# Patient Record
Sex: FEMALE | Race: WHITE | NOT HISPANIC OR LATINO
[De-identification: names, ages, dates, MRNs, and addresses within clinical notes are randomized per-mention and may not be internally consistent; named-entity substitution may affect disease eponyms.]

---

## 2020-02-01 ENCOUNTER — ASOB RESULT (OUTPATIENT)
Age: 40
End: 2020-02-01

## 2020-02-01 ENCOUNTER — APPOINTMENT (OUTPATIENT)
Dept: ANTEPARTUM | Facility: CLINIC | Age: 40
End: 2020-02-01
Payer: COMMERCIAL

## 2020-02-01 PROCEDURE — 76813 OB US NUCHAL MEAS 1 GEST: CPT

## 2020-02-01 PROCEDURE — 76801 OB US < 14 WKS SINGLE FETUS: CPT

## 2020-02-01 PROCEDURE — 36416 COLLJ CAPILLARY BLOOD SPEC: CPT

## 2020-02-05 PROBLEM — Z00.00 ENCOUNTER FOR PREVENTIVE HEALTH EXAMINATION: Status: ACTIVE | Noted: 2020-02-05

## 2020-03-20 ENCOUNTER — APPOINTMENT (OUTPATIENT)
Dept: ANTEPARTUM | Facility: CLINIC | Age: 40
End: 2020-03-20
Payer: COMMERCIAL

## 2020-03-20 ENCOUNTER — ASOB RESULT (OUTPATIENT)
Age: 40
End: 2020-03-20

## 2020-03-20 PROCEDURE — 76811 OB US DETAILED SNGL FETUS: CPT

## 2020-03-24 ENCOUNTER — APPOINTMENT (OUTPATIENT)
Dept: ANTEPARTUM | Facility: CLINIC | Age: 40
End: 2020-03-24
Payer: COMMERCIAL

## 2020-03-24 ENCOUNTER — ASOB RESULT (OUTPATIENT)
Age: 40
End: 2020-03-24

## 2020-03-24 PROCEDURE — 76816 OB US FOLLOW-UP PER FETUS: CPT

## 2020-08-04 ENCOUNTER — OUTPATIENT (OUTPATIENT)
Dept: INPATIENT UNIT | Facility: HOSPITAL | Age: 40
LOS: 1 days | Discharge: HOME | End: 2020-08-04

## 2020-08-04 ENCOUNTER — OUTPATIENT (OUTPATIENT)
Dept: OUTPATIENT SERVICES | Facility: HOSPITAL | Age: 40
LOS: 1 days | Discharge: HOME | End: 2020-08-04
Payer: COMMERCIAL

## 2020-08-04 VITALS — DIASTOLIC BLOOD PRESSURE: 74 MMHG | SYSTOLIC BLOOD PRESSURE: 119 MMHG | HEART RATE: 75 BPM

## 2020-08-04 VITALS
DIASTOLIC BLOOD PRESSURE: 78 MMHG | HEART RATE: 67 BPM | TEMPERATURE: 98 F | RESPIRATION RATE: 16 BRPM | SYSTOLIC BLOOD PRESSURE: 129 MMHG

## 2020-08-04 VITALS — SYSTOLIC BLOOD PRESSURE: 113 MMHG | DIASTOLIC BLOOD PRESSURE: 74 MMHG | HEART RATE: 71 BPM

## 2020-08-04 VITALS — TEMPERATURE: 98 F

## 2020-08-04 DIAGNOSIS — Z3A.00 WEEKS OF GESTATION OF PREGNANCY NOT SPECIFIED: ICD-10-CM

## 2020-08-04 DIAGNOSIS — O26.899 OTHER SPECIFIED PREGNANCY RELATED CONDITIONS, UNSPECIFIED TRIMESTER: ICD-10-CM

## 2020-08-04 PROCEDURE — 59025 FETAL NON-STRESS TEST: CPT | Mod: 26

## 2020-08-04 PROCEDURE — 99215 OFFICE O/P EST HI 40 MIN: CPT | Mod: 25

## 2020-08-04 NOTE — OB PROVIDER TRIAGE NOTE - NSHPPHYSICALEXAM_GEN_ALL_CORE
Gen: A&O x 3; Appears uncomfortable with ctx's  Vitals: BP-124/83; P-68; T-36.7'    Pulm-CTA B/L; no wheezes  Cor-Clear S1S2; no murmur  ABd exam-soft and nontender    VE-1.5/80/-2    NST cat I with 135 baseline +accels and mod variability; irreg ctx's

## 2020-08-04 NOTE — OB RN TRIAGE NOTE - NS PRO PASSIVE SMOKE EXP
Per verbal with Dr. Lescher, try either of the neuropysch listed below.  Mom is informed and verbalizes understanding.    No

## 2020-08-04 NOTE — OB PROVIDER TRIAGE NOTE - NSOBPROVIDERNOTE_OBGYN_ALL_OB_FT
40yo Caucsian female  @ 38.6 wks SLIUP uncomp PNC here complaining of frequent painful ctx's  -VE at 2:30a was 1 cm; now 1.5  -NST cat I with irreg ctx's  -pt was offered dc home or IV pain meds/therapeutic rest 40yo Caucsian female  @ 38.6 wks SLIUP uncomp PNC here complaining of frequent painful ctx's  -VE at 2:30a was 1 cm; now 1.5  -NST cat I with irreg ctx's  -pt was offered dc home or IV pain meds/therapeutic rest  -----------------------  -pt and partner discussed therapeutic rest vs dc and opt to be discharged home  -pt was dc home with instructions. Pt was dc Dr Ojeda

## 2020-08-04 NOTE — OB PROVIDER TRIAGE NOTE - HISTORY OF PRESENT ILLNESS
39 yr old  @ 38.6 wks. presents with ctx since 11 pm. Denies VB.LOF. Reports positive fetal movement. GBS negative  PNI: denies  Gynhx: denies  Surghx: denies  Medhx: denies

## 2020-08-04 NOTE — OB PROVIDER TRIAGE NOTE - HISTORY OF PRESENT ILLNESS
40yo  female  @ 38.6 wks SLIUP uncomp PNC here complainig of frequent painful ctx's. Pt was here this morning at 2:30am for ctx's and was dc at 1 cm. Pt is intact with GFM.    Pmhx-denies  Pshx/Hosp-denies  Meds-PNV  NKDA  Past ob-denies  Gyn-denies

## 2020-08-05 ENCOUNTER — INPATIENT (INPATIENT)
Facility: HOSPITAL | Age: 40
LOS: 0 days | Discharge: HOME | End: 2020-08-06
Attending: OBSTETRICS & GYNECOLOGY | Admitting: OBSTETRICS & GYNECOLOGY

## 2020-08-05 ENCOUNTER — TRANSCRIPTION ENCOUNTER (OUTPATIENT)
Age: 40
End: 2020-08-05

## 2020-08-05 VITALS
DIASTOLIC BLOOD PRESSURE: 70 MMHG | TEMPERATURE: 98 F | HEART RATE: 84 BPM | SYSTOLIC BLOOD PRESSURE: 116 MMHG | RESPIRATION RATE: 18 BRPM

## 2020-08-05 DIAGNOSIS — Z3A.00 WEEKS OF GESTATION OF PREGNANCY NOT SPECIFIED: ICD-10-CM

## 2020-08-05 DIAGNOSIS — O26.899 OTHER SPECIFIED PREGNANCY RELATED CONDITIONS, UNSPECIFIED TRIMESTER: ICD-10-CM

## 2020-08-05 PROBLEM — Z78.9 OTHER SPECIFIED HEALTH STATUS: Chronic | Status: ACTIVE | Noted: 2020-08-04

## 2020-08-05 LAB
BASOPHILS # BLD AUTO: 0.02 K/UL — SIGNIFICANT CHANGE UP (ref 0–0.2)
BASOPHILS NFR BLD AUTO: 0.1 % — SIGNIFICANT CHANGE UP (ref 0–2)
BLD GP AB SCN SERPL QL: NEGATIVE — SIGNIFICANT CHANGE UP
EOSINOPHIL # BLD AUTO: 0.03 K/UL — SIGNIFICANT CHANGE UP (ref 0–0.5)
EOSINOPHIL NFR BLD AUTO: 0.2 % — SIGNIFICANT CHANGE UP (ref 0–6)
HCT VFR BLD CALC: 40.9 % — SIGNIFICANT CHANGE UP (ref 34.5–45)
HGB BLD-MCNC: 13.8 G/DL — SIGNIFICANT CHANGE UP (ref 11.5–15.5)
IMM GRANULOCYTES NFR BLD AUTO: 0.5 % — SIGNIFICANT CHANGE UP (ref 0–1.5)
LYMPHOCYTES # BLD AUTO: 0.71 K/UL — LOW (ref 1–3.3)
LYMPHOCYTES # BLD AUTO: 4.8 % — LOW (ref 13–44)
MCHC RBC-ENTMCNC: 32.8 PG — SIGNIFICANT CHANGE UP (ref 27–34)
MCHC RBC-ENTMCNC: 33.7 % — SIGNIFICANT CHANGE UP (ref 32–36)
MCV RBC AUTO: 97.1 FL — SIGNIFICANT CHANGE UP (ref 80–100)
MONOCYTES # BLD AUTO: 0.41 K/UL — SIGNIFICANT CHANGE UP (ref 0–0.9)
MONOCYTES NFR BLD AUTO: 2.8 % — SIGNIFICANT CHANGE UP (ref 2–14)
NEUTROPHILS # BLD AUTO: 13.53 K/UL — HIGH (ref 1.8–7.4)
NEUTROPHILS NFR BLD AUTO: 91.6 % — HIGH (ref 43–77)
NRBC # FLD: 0 K/UL — SIGNIFICANT CHANGE UP (ref 0–0)
PLATELET # BLD AUTO: 253 K/UL — SIGNIFICANT CHANGE UP (ref 150–400)
PMV BLD: 12.1 FL — SIGNIFICANT CHANGE UP (ref 7–13)
RBC # BLD: 4.21 M/UL — SIGNIFICANT CHANGE UP (ref 3.8–5.2)
RBC # FLD: 13.2 % — SIGNIFICANT CHANGE UP (ref 10.3–14.5)
RH IG SCN BLD-IMP: POSITIVE — SIGNIFICANT CHANGE UP
RH IG SCN BLD-IMP: POSITIVE — SIGNIFICANT CHANGE UP
SARS-COV-2 RNA SPEC QL NAA+PROBE: SIGNIFICANT CHANGE UP
T PALLIDUM AB TITR SER: NEGATIVE — SIGNIFICANT CHANGE UP
WBC # BLD: 14.78 K/UL — HIGH (ref 3.8–10.5)
WBC # FLD AUTO: 14.78 K/UL — HIGH (ref 3.8–10.5)

## 2020-08-05 RX ORDER — DIBUCAINE 1 %
1 OINTMENT (GRAM) RECTAL EVERY 6 HOURS
Refills: 0 | Status: DISCONTINUED | OUTPATIENT
Start: 2020-08-05 | End: 2020-08-06

## 2020-08-05 RX ORDER — ACETAMINOPHEN 500 MG
3 TABLET ORAL
Qty: 0 | Refills: 0 | DISCHARGE
Start: 2020-08-05

## 2020-08-05 RX ORDER — SODIUM CHLORIDE 9 MG/ML
3 INJECTION INTRAMUSCULAR; INTRAVENOUS; SUBCUTANEOUS EVERY 8 HOURS
Refills: 0 | Status: DISCONTINUED | OUTPATIENT
Start: 2020-08-05 | End: 2020-08-06

## 2020-08-05 RX ORDER — MAGNESIUM HYDROXIDE 400 MG/1
30 TABLET, CHEWABLE ORAL
Refills: 0 | Status: DISCONTINUED | OUTPATIENT
Start: 2020-08-05 | End: 2020-08-06

## 2020-08-05 RX ORDER — SODIUM CHLORIDE 9 MG/ML
1000 INJECTION, SOLUTION INTRAVENOUS
Refills: 0 | Status: DISCONTINUED | OUTPATIENT
Start: 2020-08-05 | End: 2020-08-05

## 2020-08-05 RX ORDER — SIMETHICONE 80 MG/1
1 TABLET, CHEWABLE ORAL
Qty: 0 | Refills: 0 | DISCHARGE
Start: 2020-08-05

## 2020-08-05 RX ORDER — BENZOCAINE 10 %
1 GEL (GRAM) MUCOUS MEMBRANE EVERY 6 HOURS
Refills: 0 | Status: DISCONTINUED | OUTPATIENT
Start: 2020-08-05 | End: 2020-08-06

## 2020-08-05 RX ORDER — OXYTOCIN 10 UNIT/ML
1 VIAL (ML) INJECTION
Qty: 30 | Refills: 0 | Status: DISCONTINUED | OUTPATIENT
Start: 2020-08-05 | End: 2020-08-05

## 2020-08-05 RX ORDER — AER TRAVELER 0.5 G/1
1 SOLUTION RECTAL; TOPICAL
Qty: 0 | Refills: 0 | DISCHARGE
Start: 2020-08-05

## 2020-08-05 RX ORDER — OXYTOCIN 10 UNIT/ML
333.33 VIAL (ML) INJECTION
Qty: 20 | Refills: 0 | Status: COMPLETED | OUTPATIENT
Start: 2020-08-05 | End: 2020-08-05

## 2020-08-05 RX ORDER — HYDROCORTISONE 1 %
1 OINTMENT (GRAM) TOPICAL EVERY 6 HOURS
Refills: 0 | Status: DISCONTINUED | OUTPATIENT
Start: 2020-08-05 | End: 2020-08-06

## 2020-08-05 RX ORDER — IBUPROFEN 200 MG
1 TABLET ORAL
Qty: 0 | Refills: 0 | DISCHARGE
Start: 2020-08-05

## 2020-08-05 RX ORDER — KETOROLAC TROMETHAMINE 30 MG/ML
30 SYRINGE (ML) INJECTION ONCE
Refills: 0 | Status: DISCONTINUED | OUTPATIENT
Start: 2020-08-05 | End: 2020-08-05

## 2020-08-05 RX ORDER — DIPHENHYDRAMINE HCL 50 MG
25 CAPSULE ORAL EVERY 6 HOURS
Refills: 0 | Status: DISCONTINUED | OUTPATIENT
Start: 2020-08-05 | End: 2020-08-06

## 2020-08-05 RX ORDER — TETANUS TOXOID, REDUCED DIPHTHERIA TOXOID AND ACELLULAR PERTUSSIS VACCINE, ADSORBED 5; 2.5; 8; 8; 2.5 [IU]/.5ML; [IU]/.5ML; UG/.5ML; UG/.5ML; UG/.5ML
0.5 SUSPENSION INTRAMUSCULAR ONCE
Refills: 0 | Status: DISCONTINUED | OUTPATIENT
Start: 2020-08-05 | End: 2020-08-06

## 2020-08-05 RX ORDER — OXYTOCIN 10 UNIT/ML
333.33 VIAL (ML) INJECTION
Qty: 20 | Refills: 0 | Status: DISCONTINUED | OUTPATIENT
Start: 2020-08-05 | End: 2020-08-06

## 2020-08-05 RX ORDER — ACETAMINOPHEN 500 MG
975 TABLET ORAL
Refills: 0 | Status: DISCONTINUED | OUTPATIENT
Start: 2020-08-05 | End: 2020-08-06

## 2020-08-05 RX ORDER — SIMETHICONE 80 MG/1
80 TABLET, CHEWABLE ORAL EVERY 4 HOURS
Refills: 0 | Status: DISCONTINUED | OUTPATIENT
Start: 2020-08-05 | End: 2020-08-06

## 2020-08-05 RX ORDER — IBUPROFEN 200 MG
600 TABLET ORAL EVERY 6 HOURS
Refills: 0 | Status: COMPLETED | OUTPATIENT
Start: 2020-08-05 | End: 2021-07-04

## 2020-08-05 RX ORDER — PRAMOXINE HYDROCHLORIDE 150 MG/15G
1 AEROSOL, FOAM RECTAL EVERY 4 HOURS
Refills: 0 | Status: DISCONTINUED | OUTPATIENT
Start: 2020-08-05 | End: 2020-08-06

## 2020-08-05 RX ORDER — LANOLIN
1 OINTMENT (GRAM) TOPICAL EVERY 6 HOURS
Refills: 0 | Status: DISCONTINUED | OUTPATIENT
Start: 2020-08-05 | End: 2020-08-06

## 2020-08-05 RX ORDER — IBUPROFEN 200 MG
600 TABLET ORAL EVERY 6 HOURS
Refills: 0 | Status: DISCONTINUED | OUTPATIENT
Start: 2020-08-05 | End: 2020-08-06

## 2020-08-05 RX ORDER — AER TRAVELER 0.5 G/1
1 SOLUTION RECTAL; TOPICAL EVERY 4 HOURS
Refills: 0 | Status: DISCONTINUED | OUTPATIENT
Start: 2020-08-05 | End: 2020-08-06

## 2020-08-05 RX ADMIN — PRAMOXINE HYDROCHLORIDE 1 APPLICATION(S): 150 AEROSOL, FOAM RECTAL at 18:35

## 2020-08-05 RX ADMIN — Medication 1000 MILLIUNIT(S)/MIN: at 14:45

## 2020-08-05 RX ADMIN — Medication 1 SPRAY(S): at 18:35

## 2020-08-05 RX ADMIN — Medication 975 MILLIGRAM(S): at 18:35

## 2020-08-05 RX ADMIN — Medication 1 APPLICATION(S): at 18:35

## 2020-08-05 RX ADMIN — Medication 30 MILLIGRAM(S): at 13:07

## 2020-08-05 RX ADMIN — Medication 30 MILLIGRAM(S): at 11:52

## 2020-08-05 RX ADMIN — AER TRAVELER 1 APPLICATION(S): 0.5 SOLUTION RECTAL; TOPICAL at 18:35

## 2020-08-05 RX ADMIN — Medication 975 MILLIGRAM(S): at 19:06

## 2020-08-05 RX ADMIN — Medication 1 MILLIUNIT(S)/MIN: at 06:36

## 2020-08-05 NOTE — CHART NOTE - NSCHARTNOTEFT_GEN_A_CORE
Patient seen at bedside   Reports pressure   Tracing category 1   [place]; irregular   VE: 10/100/0   Trial pushing initiated however contractions too far apart   Pitocin to be started   Reassess as needed

## 2020-08-05 NOTE — OB PROVIDER H&P - HISTORY OF PRESENT ILLNESS
39y  at 39w0d presents to triage c/o LOF since 2am today and also having strong uterine contractions.  Reports +FM, no vaginal bleeding  Prenatal care: Dr Meza  GBS: Negative 20;

## 2020-08-05 NOTE — DISCHARGE NOTE OB - MEDICATION SUMMARY - MEDICATIONS TO TAKE
I will START or STAY ON the medications listed below when I get home from the hospital:    ibuprofen 600 mg oral tablet  -- 1 tab(s) by mouth every 6 hours, As Needed  -- Indication: For pain    acetaminophen 325 mg oral tablet  -- 3 tab(s) by mouth , As Needed  -- Indication: For pain    witch hazel 50% topical pad  -- 1 application on skin every 4 hours, As needed, Perineal discomfort  -- Indication: For perineal discomfort    simethicone 80 mg oral tablet, chewable  -- 1 tab(s) by mouth every 4 hours, As needed, Gas  -- Indication: For gas

## 2020-08-05 NOTE — DISCHARGE NOTE OB - PATIENT PORTAL LINK FT
You can access the FollowMyHealth Patient Portal offered by [facility] by registering at the following website: [email or web address]. By joining [employer]’s FollowMyHealth portal, you will also be able to view your health information using other applications (apps) compatible with our system.

## 2020-08-05 NOTE — OB PROVIDER TRIAGE NOTE - NSHPPHYSICALEXAM_GEN_ALL_CORE
T(C): 36.6 (08-05-20 @ 02:57), Max: 36.7 (08-04-20 @ 17:18)  HR: 84 (08-05-20 @ 02:57) (67 - 84)  BP: 116/70 (08-05-20 @ 02:57) (113/74 - 129/78)  RR: 18 (08-05-20 @ 02:55) (16 - 18)    Heart: RRR  Lungs: CTA  Abdomen: Gravid, soft, NT    NST: Reactive with moderate variability, Category 1 tracing  [place]: Irregular contractions  VE: 4/90/-1, Gross ROM, bloody with thick meconium

## 2020-08-05 NOTE — OB PROVIDER DELIVERY SUMMARY - NSPROVIDERDELIVERYNOTE_OBGYN_ALL_OB_FT
Vacuum assisted delivery of liveborn female infant from JIHAN position. Head, shoulders, and body delivered without complication. Infant was suctioned. heavy mec. Delayed cord clamping and infant was passed to mother. Cord clamped and cut. Placenta delivered intact with a 3 vessel cord. Fundal massage was given and uterine fundus was found to be firm. Vaginal exam revealed an intact cervix, vaginal walls and sulci. Patient had a second degree laceration with a left labial tear that was repaired with 2.0 chromic suture. Excellent hemostasis was noted. Patient was stable and went to recovery. Count was correct x 2.

## 2020-08-05 NOTE — CHART NOTE - NSCHARTNOTEFT_GEN_A_CORE
Patient seen at bedside   Resting comfortably with epidural in place   Tracing category 1   [place]: irregular   VE: 9.5/100/0   Reassess as needed

## 2020-08-05 NOTE — DISCHARGE NOTE OB - CARE PLAN
Principal Discharge DX:	 (normal spontaneous vaginal delivery)  Goal:	post partum care  Assessment and plan of treatment:	recovery

## 2020-08-05 NOTE — OB PROVIDER H&P - ASSESSMENT
39y  at 39w0d presents to triage c/o LOF since 2am today and also having strong uterine contractions.  Reports +FM, no vaginal bleeding  Prenatal care: Dr Meza  GBS: Negative 20;     GYN: Denies any h/o STDs, fibroids, ovarian Cyst, or abnormal pap smear  OBH:   PAST MEDICAL & SURGICAL HISTORY: Denies    No Known Allergies    MEDICATIONS  (PRN):    T(C): 36.6 (20 @ 02:57), Max: 36.7 (20 @ 17:18)  HR: 84 (20 @ 02:57) (67 - 84)  BP: 116/70 (20 @ 02:57) (113/74 - 129/78)  RR: 18 (20 @ 02:55) (16 - 18)    Heart: RRR  Lungs: CTA  Abdomen: Gravid, soft, NT    NST: Reactive with moderate variability, Category 1 tracing  [place]: Irregular contractions  VE: 4/90/-1, Gross ROM, bloody with thick meconium    A/P: 39y  at 39w0d in active labor with SROM             Thick meconium present  D/w Dr Ojeda  -Admit to labor and delivery  -Pain Management: Epidural  -Cont EFM/[place]  -Admission labs: CBC, RPR, T&S and covid 19 sent  -IV hydration  -Clear liquid diet

## 2020-08-05 NOTE — OB NEONATOLOGY/PEDIATRICIAN DELIVERY SUMMARY - NSPEDSNEONOTESA_OBGYN_ALL_OB_FT
Called to this vaginal vacuum- assist delivery d/t Meconium of this 39 week gestation female infant. Mom is a 40 yo  O+ with unremarkable prenatal labs. GBS negative on . SROM ~ 8.5 hrs PTD, thick meconium. Covid negative. Infant born with spontaneous cry. Cord clamping delayed for 30 seconds. W/D/S/S and bulb suctioning mouth/nares. Apgar scores were 8 & 9. EOS 0.08. Infant noted to have mild substernal retractions . Saturations 94- 98 % in room air @ 10 minutes of life. Called to this vaginal vacuum- assist delivery d/t Meconium of this 39 week gestation female infant. Mom is a 38 yo  O+ with unremarkable prenatal labs. GBS negative on . SROM ~ 8.5 hrs PTD, thick meconium. Covid negative. Infant born with spontaneous cry. Cord clamping delayed for 30 seconds. W/D/S/S and bulb suctioning mouth/nares. Apgar scores were 8 & 9. EOS 0.08. Infant noted to have mild substernal retractions which resolved . Saturations 95- 98 % in room air @ 10 minutes of life. Mom wants Hep B and to bottle feed.

## 2020-08-05 NOTE — OB PROVIDER TRIAGE NOTE - NSOBPROVIDERNOTE_OBGYN_ALL_OB_FT
39y  at 39w0d presents to triage c/o LOF since 2am today and also having strong uterine contractions.  Reports +FM, no vaginal bleeding  Prenatal care: Dr Meza  GBS: Negative 20;     GYN: Denies any h/o STDs, fibroids, ovarian Cyst, or abnormal pap smear  OBH:   PAST MEDICAL & SURGICAL HISTORY: Denies    No Known Allergies    MEDICATIONS  (PRN):    T(C): 36.6 (20 @ 02:57), Max: 36.7 (20 @ 17:18)  HR: 84 (20 @ 02:57) (67 - 84)  BP: 116/70 (20 @ 02:57) (113/74 - 129/78)  RR: 18 (20 @ 02:55) (16 - 18)    Heart: RRR  Lungs: CTA  Abdomen: Gravid, soft, NT    NST: Reactive with moderate variability, Category 1 tracing  [place]: Irregular contractions  VE: 4/90/-1, Gross ROM, bloody with thick meconium    A/P: 39y  at 39w0d in active labor with SROM             Thick meconium present  D/w Dr Ojeda  -Admit to labor and delivery  -Pain Management: Epidural  -Cont EFM/[place]  -Admission labs: CBC, RPR, T&S  -IV hydration  -Clear liquid diet

## 2020-08-05 NOTE — OB PROVIDER H&P - PROBLEM SELECTOR PLAN 1
-Admit to labor and delivery  -Pain Management: Epidural  -Cont EFM/[place]  -Admission labs: CBC, RPR, T&S and covid 19 sent  -IV hydration  -Clear liquid diet

## 2020-08-05 NOTE — OB RN DELIVERY SUMMARY - NS_SEPSISRSKCALC_OBGYN_ALL_OB_FT
EOS calculated successfully. EOS Risk Factor: 0.5/1000 live births ([facility] national incidence); GA=39w;Temp=98.06; ROM=8.417; GBS='Negative'; Antibiotics='No antibiotics or any antibiotics < 2 hrs prior to birth'

## 2020-08-05 NOTE — OB PROVIDER TRIAGE NOTE - HISTORY OF PRESENT ILLNESS
CC: pt states "my water broke at 0200am and I'm having contractions every minute" 39y  at 39w0d presents to triage c/o LOF since 2am today and also having strong uterine contractions.  Reports +FM, no vaginal bleeding  Prenatal care: Dr Meza  GBS: Negative 20;

## 2020-08-05 NOTE — OB PROVIDER H&P - NSHPPHYSICALEXAM_GEN_ALL_CORE
T(C): 36.6 (08-05-20 @ 02:57), Max: 36.7 (08-04-20 @ 17:18)  HR: 84 (08-05-20 @ 02:57) (67 - 84)  BP: 116/70 (08-05-20 @ 02:57) (113/74 - 129/78)  RR: 18 (08-05-20 @ 02:55) (16 - 18)    Heart: RRR  Lungs: CTA  Abdomen: Gravid, soft, NT    NST: Reactive with moderate variability, Category 1 tracing  [place]: Irregular contractions  VE: 4/90/-1, Gross ROM, bloody with thick meconium  TAS: Cephalic confirmed

## 2020-08-05 NOTE — DISCHARGE NOTE OB - CARE PROVIDER_API CALL
Johan Gee)  Obstetrics and Gynecology Obstetrics and Gynocology  [address] [place] [address]  Phone: [phone]  Fax: [phone]  Follow Up Time: 1 month

## 2020-08-06 VITALS
RESPIRATION RATE: 16 BRPM | TEMPERATURE: 99 F | OXYGEN SATURATION: 100 % | SYSTOLIC BLOOD PRESSURE: 122 MMHG | HEART RATE: 67 BPM | DIASTOLIC BLOOD PRESSURE: 76 MMHG

## 2020-08-06 RX ADMIN — SODIUM CHLORIDE 3 MILLILITER(S): 9 INJECTION INTRAMUSCULAR; INTRAVENOUS; SUBCUTANEOUS at 06:22

## 2020-08-06 RX ADMIN — Medication 975 MILLIGRAM(S): at 06:22

## 2020-08-06 RX ADMIN — Medication 975 MILLIGRAM(S): at 13:00

## 2020-08-06 RX ADMIN — Medication 975 MILLIGRAM(S): at 13:50

## 2020-08-06 NOTE — PROGRESS NOTE ADULT - SUBJECTIVE AND OBJECTIVE BOX
NP Provider Note:  Patient seen at bedside resting comfortably offers no current complaints.  Ambulating and voiding without difficulty.  Passing flatus and tolerating regular diet.  both breast/bottle feeding. Bonding well w/ .  Denies HA, CP, SOB, N/V/D, dizziness, palpitations, worsening abdominal pain, worsening vaginal bleeding, or any other concerns.      Vital Signs Last 24 Hrs  T(C): 36.5 (06 Aug 2020 06:03), Max: 36.8 (05 Aug 2020 16:58)  T(F): 97.7 (06 Aug 2020 06:03), Max: 98.3 (05 Aug 2020 20:45)  HR: 67 (06 Aug 2020 06:03) (67 - 99)  BP: 113/76 (06 Aug 2020 06:03) (103/62 - 114/63)  BP(mean): --  RR: 18 (06 Aug 2020 06:03) (16 - 18)  SpO2: 100% (06 Aug 2020 06:03) (88% - 100%)                              13.8   14.78 )-----------( 253      ( 05 Aug 2020 03:25 )             40.9        A/P:  PPD#1 s/p VAVD    - Discharge home with instructions  -Follow up in office in 5-6 weeks for postpartum visit  -Breastfeeding encouraged   -d/w dr Carolina Ayala AGNP-C  [phone]

## 2020-08-18 ENCOUNTER — TRANSCRIPTION ENCOUNTER (OUTPATIENT)
Age: 40
End: 2020-08-18

## 2021-03-17 NOTE — OB RN TRIAGE NOTE - PT NEEDS ASSIST
Ms. Castellon presents clinic for follow-up.  She was seen on January 27.  She is due for surveillance colonoscopy.  She is also complaining of issues with constipation.  She is encouraged to increase her water,start fiber and given samples of Linzess 72 mcg.  Patient had a colonoscopy on March 3.  This was complete to the cecum.  A splenic flexure polyp was removed as well as left-sided diverticulosis and internal/external hemorrhoids noted.  Polyp was adenomatous on pathology.  Patient states that she took the Linzess a few days but then with the increase in water she is now moving her bowels on her own daily.  The stools are soft.  She is no longer straining.  Hemorrhoids that we found on colonoscopy are not symptomatic.
no

## 2021-03-29 NOTE — OB PROVIDER H&P - PRO HBSAG INFANT
Patient swabbed for COVID-19 testing.  cough  Ashley Peralta LPN on 3/29/2021 at 5:00 PM      
negative

## 2021-07-20 NOTE — OB PROVIDER TRIAGE NOTE - NS_FETALPRESENTATIONA_OBGYN_ALL_OB
I have inactivated Mr Rivers's spouse, Fern Rivers, from his contact list per patient's request due to death of spouse. Mr. Rivers has a living will on file which designates Fern Rivers as his surrogate, and also contains his directives regarding life sustaining treatment. In the absence of a healthcare surrogate, Mr. Rivers's living will directive is still valid concerning his wishes regarding life sustaining treatments.    I have attempted to contact Mr. Rivers by phone to offer assistance with selecting a new healthcare surrogate. I was unable to leave a message. Advance Care Planning facilitators are available to assist patient with healthcare surrogate selection, advance care planning and the completion of advance directives as needed.   Cephalic

## 2022-06-26 NOTE — DISCHARGE NOTE OB - MEDICATION SUMMARY - MEDICATIONS TO STOP TAKING
gradual onset I will STOP taking the medications listed below when I get home from the hospital:  None

## 2023-03-14 ENCOUNTER — NON-APPOINTMENT (OUTPATIENT)
Age: 43
End: 2023-03-14

## 2023-03-15 ENCOUNTER — APPOINTMENT (OUTPATIENT)
Dept: ANTEPARTUM | Facility: CLINIC | Age: 43
End: 2023-03-15
Payer: COMMERCIAL

## 2023-03-15 ENCOUNTER — LABORATORY RESULT (OUTPATIENT)
Age: 43
End: 2023-03-15

## 2023-03-15 ENCOUNTER — ASOB RESULT (OUTPATIENT)
Age: 43
End: 2023-03-15

## 2023-03-15 PROCEDURE — 36415 COLL VENOUS BLD VENIPUNCTURE: CPT

## 2023-03-15 PROCEDURE — 76801 OB US < 14 WKS SINGLE FETUS: CPT

## 2023-03-15 PROCEDURE — 76813 OB US NUCHAL MEAS 1 GEST: CPT

## 2023-05-05 ENCOUNTER — ASOB RESULT (OUTPATIENT)
Age: 43
End: 2023-05-05

## 2023-05-05 ENCOUNTER — APPOINTMENT (OUTPATIENT)
Dept: ANTEPARTUM | Facility: CLINIC | Age: 43
End: 2023-05-05
Payer: COMMERCIAL

## 2023-05-05 PROCEDURE — 76811 OB US DETAILED SNGL FETUS: CPT

## 2023-05-09 ENCOUNTER — APPOINTMENT (OUTPATIENT)
Dept: ANTEPARTUM | Facility: CLINIC | Age: 43
End: 2023-05-09
Payer: COMMERCIAL

## 2023-05-09 ENCOUNTER — ASOB RESULT (OUTPATIENT)
Age: 43
End: 2023-05-09

## 2023-05-09 PROCEDURE — 76816 OB US FOLLOW-UP PER FETUS: CPT

## 2023-05-24 ENCOUNTER — APPOINTMENT (OUTPATIENT)
Dept: ANTEPARTUM | Facility: CLINIC | Age: 43
End: 2023-05-24
Payer: COMMERCIAL

## 2023-05-24 ENCOUNTER — ASOB RESULT (OUTPATIENT)
Age: 43
End: 2023-05-24

## 2023-05-24 PROCEDURE — 76825 ECHO EXAM OF FETAL HEART: CPT

## 2023-05-24 PROCEDURE — 76827 ECHO EXAM OF FETAL HEART: CPT

## 2023-05-24 PROCEDURE — 93325 DOPPLER ECHO COLOR FLOW MAPG: CPT

## 2023-06-13 ENCOUNTER — APPOINTMENT (OUTPATIENT)
Dept: ANTEPARTUM | Facility: CLINIC | Age: 43
End: 2023-06-13
Payer: COMMERCIAL

## 2023-06-13 ENCOUNTER — ASOB RESULT (OUTPATIENT)
Age: 43
End: 2023-06-13

## 2023-06-13 PROCEDURE — 76816 OB US FOLLOW-UP PER FETUS: CPT

## 2023-07-11 ENCOUNTER — ASOB RESULT (OUTPATIENT)
Age: 43
End: 2023-07-11

## 2023-07-11 ENCOUNTER — APPOINTMENT (OUTPATIENT)
Dept: ANTEPARTUM | Facility: CLINIC | Age: 43
End: 2023-07-11
Payer: COMMERCIAL

## 2023-07-11 PROCEDURE — 76816 OB US FOLLOW-UP PER FETUS: CPT

## 2023-07-11 PROCEDURE — 76819 FETAL BIOPHYS PROFIL W/O NST: CPT | Mod: 59

## 2023-08-08 ENCOUNTER — APPOINTMENT (OUTPATIENT)
Dept: ANTEPARTUM | Facility: CLINIC | Age: 43
End: 2023-08-08

## 2023-09-04 ENCOUNTER — TRANSCRIPTION ENCOUNTER (OUTPATIENT)
Age: 43
End: 2023-09-04

## 2023-09-04 ENCOUNTER — INPATIENT (INPATIENT)
Facility: HOSPITAL | Age: 43
LOS: 1 days | Discharge: HOME | End: 2023-09-06
Attending: OBSTETRICS & GYNECOLOGY | Admitting: OBSTETRICS & GYNECOLOGY

## 2023-09-04 VITALS
HEART RATE: 95 BPM | DIASTOLIC BLOOD PRESSURE: 78 MMHG | TEMPERATURE: 97 F | RESPIRATION RATE: 17 BRPM | SYSTOLIC BLOOD PRESSURE: 137 MMHG

## 2023-09-04 DIAGNOSIS — O26.899 OTHER SPECIFIED PREGNANCY RELATED CONDITIONS, UNSPECIFIED TRIMESTER: ICD-10-CM

## 2023-09-04 LAB
BASOPHILS # BLD AUTO: 0.01 K/UL — SIGNIFICANT CHANGE UP (ref 0–0.2)
BASOPHILS NFR BLD AUTO: 0.1 % — SIGNIFICANT CHANGE UP (ref 0–2)
EOSINOPHIL # BLD AUTO: 0.05 K/UL — SIGNIFICANT CHANGE UP (ref 0–0.5)
EOSINOPHIL NFR BLD AUTO: 0.6 % — SIGNIFICANT CHANGE UP (ref 0–6)
HCT VFR BLD CALC: 35.4 % — SIGNIFICANT CHANGE UP (ref 34.5–45)
HGB BLD-MCNC: 12.2 G/DL — SIGNIFICANT CHANGE UP (ref 11.5–15.5)
IANC: 6.67 K/UL — SIGNIFICANT CHANGE UP (ref 1.8–7.4)
IMM GRANULOCYTES NFR BLD AUTO: 0.8 % — SIGNIFICANT CHANGE UP (ref 0–0.9)
LYMPHOCYTES # BLD AUTO: 0.95 K/UL — LOW (ref 1–3.3)
LYMPHOCYTES # BLD AUTO: 11.4 % — LOW (ref 13–44)
MCHC RBC-ENTMCNC: 33 PG — SIGNIFICANT CHANGE UP (ref 27–34)
MCHC RBC-ENTMCNC: 34.5 GM/DL — SIGNIFICANT CHANGE UP (ref 32–36)
MCV RBC AUTO: 95.7 FL — SIGNIFICANT CHANGE UP (ref 80–100)
MONOCYTES # BLD AUTO: 0.56 K/UL — SIGNIFICANT CHANGE UP (ref 0–0.9)
MONOCYTES NFR BLD AUTO: 6.7 % — SIGNIFICANT CHANGE UP (ref 2–14)
NEUTROPHILS # BLD AUTO: 6.67 K/UL — SIGNIFICANT CHANGE UP (ref 1.8–7.4)
NEUTROPHILS NFR BLD AUTO: 80.4 % — HIGH (ref 43–77)
NRBC # BLD: 0 /100 WBCS — SIGNIFICANT CHANGE UP (ref 0–0)
NRBC # FLD: 0 K/UL — SIGNIFICANT CHANGE UP (ref 0–0)
PLATELET # BLD AUTO: 226 K/UL — SIGNIFICANT CHANGE UP (ref 150–400)
RBC # BLD: 3.7 M/UL — LOW (ref 3.8–5.2)
RBC # FLD: 13.7 % — SIGNIFICANT CHANGE UP (ref 10.3–14.5)
WBC # BLD: 8.31 K/UL — SIGNIFICANT CHANGE UP (ref 3.8–10.5)
WBC # FLD AUTO: 8.31 K/UL — SIGNIFICANT CHANGE UP (ref 3.8–10.5)

## 2023-09-04 RX ORDER — HYDROCORTISONE 1 %
1 OINTMENT (GRAM) TOPICAL EVERY 6 HOURS
Refills: 0 | Status: DISCONTINUED | OUTPATIENT
Start: 2023-09-04 | End: 2023-09-06

## 2023-09-04 RX ORDER — BENZOCAINE 10 %
1 GEL (GRAM) MUCOUS MEMBRANE EVERY 6 HOURS
Refills: 0 | Status: DISCONTINUED | OUTPATIENT
Start: 2023-09-04 | End: 2023-09-06

## 2023-09-04 RX ORDER — KETOROLAC TROMETHAMINE 30 MG/ML
30 SYRINGE (ML) INJECTION ONCE
Refills: 0 | Status: DISCONTINUED | OUTPATIENT
Start: 2023-09-04 | End: 2023-09-05

## 2023-09-04 RX ORDER — OXYTOCIN 10 UNIT/ML
333.33 VIAL (ML) INJECTION
Qty: 20 | Refills: 0 | Status: DISCONTINUED | OUTPATIENT
Start: 2023-09-04 | End: 2023-09-04

## 2023-09-04 RX ORDER — CHLORHEXIDINE GLUCONATE 213 G/1000ML
1 SOLUTION TOPICAL DAILY
Refills: 0 | Status: DISCONTINUED | OUTPATIENT
Start: 2023-09-04 | End: 2023-09-04

## 2023-09-04 RX ORDER — MAGNESIUM HYDROXIDE 400 MG/1
30 TABLET, CHEWABLE ORAL
Refills: 0 | Status: DISCONTINUED | OUTPATIENT
Start: 2023-09-04 | End: 2023-09-06

## 2023-09-04 RX ORDER — PRAMOXINE HYDROCHLORIDE 150 MG/15G
1 AEROSOL, FOAM RECTAL EVERY 4 HOURS
Refills: 0 | Status: DISCONTINUED | OUTPATIENT
Start: 2023-09-04 | End: 2023-09-06

## 2023-09-04 RX ORDER — ACETAMINOPHEN 500 MG
975 TABLET ORAL
Refills: 0 | Status: DISCONTINUED | OUTPATIENT
Start: 2023-09-04 | End: 2023-09-06

## 2023-09-04 RX ORDER — DIPHENHYDRAMINE HCL 50 MG
25 CAPSULE ORAL EVERY 6 HOURS
Refills: 0 | Status: DISCONTINUED | OUTPATIENT
Start: 2023-09-04 | End: 2023-09-06

## 2023-09-04 RX ORDER — DIBUCAINE 1 %
1 OINTMENT (GRAM) RECTAL EVERY 6 HOURS
Refills: 0 | Status: DISCONTINUED | OUTPATIENT
Start: 2023-09-04 | End: 2023-09-06

## 2023-09-04 RX ORDER — OXYCODONE HYDROCHLORIDE 5 MG/1
5 TABLET ORAL
Refills: 0 | Status: DISCONTINUED | OUTPATIENT
Start: 2023-09-04 | End: 2023-09-06

## 2023-09-04 RX ORDER — OXYTOCIN 10 UNIT/ML
333.33 VIAL (ML) INJECTION
Qty: 20 | Refills: 0 | Status: DISCONTINUED | OUTPATIENT
Start: 2023-09-04 | End: 2023-09-05

## 2023-09-04 RX ORDER — IBUPROFEN 200 MG
600 TABLET ORAL EVERY 6 HOURS
Refills: 0 | Status: COMPLETED | OUTPATIENT
Start: 2023-09-04 | End: 2024-08-02

## 2023-09-04 RX ORDER — SIMETHICONE 80 MG/1
80 TABLET, CHEWABLE ORAL EVERY 4 HOURS
Refills: 0 | Status: DISCONTINUED | OUTPATIENT
Start: 2023-09-04 | End: 2023-09-06

## 2023-09-04 RX ORDER — OXYCODONE HYDROCHLORIDE 5 MG/1
5 TABLET ORAL ONCE
Refills: 0 | Status: DISCONTINUED | OUTPATIENT
Start: 2023-09-04 | End: 2023-09-06

## 2023-09-04 RX ORDER — INFLUENZA VIRUS VACCINE 15; 15; 15; 15 UG/.5ML; UG/.5ML; UG/.5ML; UG/.5ML
0.5 SUSPENSION INTRAMUSCULAR ONCE
Refills: 0 | Status: DISCONTINUED | OUTPATIENT
Start: 2023-09-04 | End: 2023-09-04

## 2023-09-04 RX ORDER — LANOLIN
1 OINTMENT (GRAM) TOPICAL EVERY 6 HOURS
Refills: 0 | Status: DISCONTINUED | OUTPATIENT
Start: 2023-09-04 | End: 2023-09-06

## 2023-09-04 RX ORDER — SODIUM CHLORIDE 9 MG/ML
3 INJECTION INTRAMUSCULAR; INTRAVENOUS; SUBCUTANEOUS EVERY 8 HOURS
Refills: 0 | Status: DISCONTINUED | OUTPATIENT
Start: 2023-09-04 | End: 2023-09-06

## 2023-09-04 RX ORDER — SODIUM CHLORIDE 9 MG/ML
1000 INJECTION, SOLUTION INTRAVENOUS
Refills: 0 | Status: DISCONTINUED | OUTPATIENT
Start: 2023-09-04 | End: 2023-09-04

## 2023-09-04 RX ORDER — AER TRAVELER 0.5 G/1
1 SOLUTION RECTAL; TOPICAL EVERY 4 HOURS
Refills: 0 | Status: DISCONTINUED | OUTPATIENT
Start: 2023-09-04 | End: 2023-09-06

## 2023-09-04 RX ORDER — CITRIC ACID/SODIUM CITRATE 300-500 MG
15 SOLUTION, ORAL ORAL EVERY 6 HOURS
Refills: 0 | Status: DISCONTINUED | OUTPATIENT
Start: 2023-09-04 | End: 2023-09-04

## 2023-09-04 RX ORDER — TETANUS TOXOID, REDUCED DIPHTHERIA TOXOID AND ACELLULAR PERTUSSIS VACCINE, ADSORBED 5; 2.5; 8; 8; 2.5 [IU]/.5ML; [IU]/.5ML; UG/.5ML; UG/.5ML; UG/.5ML
0.5 SUSPENSION INTRAMUSCULAR ONCE
Refills: 0 | Status: DISCONTINUED | OUTPATIENT
Start: 2023-09-04 | End: 2023-09-06

## 2023-09-04 RX ADMIN — CHLORHEXIDINE GLUCONATE 1 APPLICATION(S): 213 SOLUTION TOPICAL at 15:19

## 2023-09-04 NOTE — OB RN DELIVERY SUMMARY - NS_SEPSISRSKCALC_OBGYN_ALL_OB_FT
EOS calculated successfully. EOS Risk Factor: 0.5/1000 live births ([facility] national incidence); GA=37w1d; Temp=97.88; ROM=10.317; GBS='Negative'; Antibiotics='No antibiotics or any antibiotics < 2 hrs prior to birth'

## 2023-09-04 NOTE — OB PROVIDER DELIVERY SUMMARY - NSPROVIDERDELIVERYNOTE_OBGYN_ALL_OB_FT
Spontaneous vaginal delivery of liveborn infant from JIHAN position. Nuchal x1 reduced. Head, shoulders, and body delivered easily. Infant was suctioned. No mec. APGARs 9/9. Delayed cord clamping and infant was passed to mother. Cord clamped and cut. Placenta delivered intact with a 3 vessel cord. Fundal massage was given and uterine fundus was found to be firm. Vaginal exam revealed an intact cervix, vaginal walls and sulci. Patient had a first degree laceration in the perineum and periurethral laceration that was repaired with 2-0 chromic suture. Excellent hemostasis was noted. Patient was stable and went to recovery. Count was correct x 2.     Meghna Marcano, PGY-1

## 2023-09-04 NOTE — OB PROVIDER H&P - NSLOWPPHRISK_OBGYN_A_OB
No previous uterine incision/Mariee Pregnancy/Less than or equal to 4 previous vaginal births/No known bleeding disorder/No history of postpartum hemorrhage/No other PPH risks indicated

## 2023-09-04 NOTE — OB PROVIDER LABOR PROGRESS NOTE - ASSESSMENT
41 y/o  at 37w1d PROM induction  - Anterior lip, anticipate   - Pt comfortable    d/w attending Dr Derrick Ashton  PGY3

## 2023-09-04 NOTE — OB RN PATIENT PROFILE - FALL HARM RISK - UNIVERSAL INTERVENTIONS
Bed in lowest position, wheels locked, appropriate side rails in place/Call bell, personal items and telephone in reach/Instruct patient to call for assistance before getting out of bed or chair/Non-slip footwear when patient is out of bed/[place] to call system/Physically safe environment - no spills, clutter or unnecessary equipment/Purposeful Proactive Rounding/Room/bathroom lighting operational, light cord in reach

## 2023-09-04 NOTE — OB PROVIDER TRIAGE NOTE - NSOBPROVIDERNOTE_OBGYN_ALL_OB_FT
Ms. ANGY BYRD is a 42y  @ 37w1d p/w lof since 12p, clear with evidence of SROM.     - NST with possible fetal arrythmia. TAUS with . Transducer changed. Audible arrhythmia present. Dr. Jones notified, recommendation to continue to watch tracing to obtain continuous NST, then to receive PO Cytotec.     Plan  - Admit to Labor and Delivery for induction of labor with PO Cytotec  - Continuous EFM  - Clear diet, IV fluids  - Consent signed  - Standard labs (T&S, CBC, RPR, covid serology)  - Epidural PRN, cleared by DrKimberly   - Induction/augmentation agent: PO Cytotec   - Consider re-examination in 4 hours     Informed consent was obtained. The following was discussed:  - Induction/augmentation of labor: use of medication and/or cook balloon to begin or enhance labor  - Obstetrical management including internal fetal/contraction monitoring  - Normal vaginal delivery  - Possible  section    Risks, benefits, alternatives, and possible complications have been discussed in detail with the patient in English, patient's preferred language, demonstrating understanding, all questions answered.. Pre-admission, admission, and post admission procedures and expectations were discussed in detail. All questions answered, all appropriate hospital consents were signed. Anticipate normal vaginal delivery.    Plan d/w Dr. Jones. Dr. Rock, PGY3 notified.

## 2023-09-04 NOTE — OB PROVIDER DELIVERY SUMMARY - NSSELHIDDEN_OBGYN_ALL_OB_FT
No [NS_DeliveryAttending1_OBGYN_ALL_OB_FT:[id number]=],[NS_DeliveryAssist1_OBGYN_ALL_OB_FT:[id number]=]

## 2023-09-04 NOTE — OB RN DELIVERY SUMMARY - NSSELHIDDEN_OBGYN_ALL_OB_FT
[NS_DeliveryAttending1_OBGYN_ALL_OB_FT:[id number]=],[NS_DeliveryAssist1_OBGYN_ALL_OB_FT:[id number]] [NS_DeliveryAttending1_OBGYN_ALL_OB_FT:[id number]=],[NS_DeliveryAssist1_OBGYN_ALL_OB_FT:[id number]=],[NS_CirculateRN2_OBGYN_ALL_OB_FT:[id number]=]

## 2023-09-04 NOTE — OB PROVIDER TRIAGE NOTE - HISTORY OF PRESENT ILLNESS
Ms. ANGY BYRD is a 42y  @ 37w1d p/w lof since 12p, clear. + FM. + Irregular, mild ctx, 4/10. GBS neg (). EFW 3200.   PNC: WHP. Two vessel cord, followed by MFM, scanned 07/15, wnl. Fetal echo  wnl. Pt reports no hospitalizations, procedures, infections, or new diagnoses in pregnancy. Pt denies complications with blood pressure and/or blood sugar.

## 2023-09-04 NOTE — CHART NOTE - NSCHARTNOTEFT_GEN_A_CORE
Patient seen and examined at bedside. VE: 2/70/-3. Tracing reviewed - Cat 1, contractions q4-5 minutes. Patient requesting epidural, anesthesia notified. Continue PO cytotec.

## 2023-09-04 NOTE — OB PROVIDER H&P - NSHPPHYSICALEXAM_GEN_ALL_CORE
T(C): 36.3 (09-04-23 @ 13:42), Max: 36.3 (09-04-23 @ 13:24)  HR: 85 (09-04-23 @ 13:44) (85 - 95)  BP: 123/76 (09-04-23 @ 13:44) (123/76 - 137/78)  RR: 17 (09-04-23 @ 13:24) (17 - 17)  SpO2: --  General: Female sitting comfortably   Head: Normocephalic. Atraumatic.   Eyes: No discharge, lids normal, conjunctiva normal  Lungs: No resp distress  Abdomen: Soft, nontender. Gravid.   TAUS:  Sono saved in ASOB.   SSE: No erythema, edema, lesions to external genitalia.   NST: Reactive. Audible fetal arrhythmia. Accelerations present. No audible declerations.   Neuro: No facial asymmetry, no slurred speech, moves all 4 extremities  Mood: Alert and lucid, appropriate mood and affect

## 2023-09-05 LAB — T PALLIDUM AB TITR SER: NEGATIVE — SIGNIFICANT CHANGE UP

## 2023-09-05 RX ORDER — IBUPROFEN 200 MG
600 TABLET ORAL EVERY 6 HOURS
Refills: 0 | Status: DISCONTINUED | OUTPATIENT
Start: 2023-09-05 | End: 2023-09-06

## 2023-09-05 RX ADMIN — Medication 975 MILLIGRAM(S): at 10:24

## 2023-09-05 RX ADMIN — Medication 975 MILLIGRAM(S): at 21:50

## 2023-09-05 RX ADMIN — SODIUM CHLORIDE 3 MILLILITER(S): 9 INJECTION INTRAMUSCULAR; INTRAVENOUS; SUBCUTANEOUS at 06:00

## 2023-09-05 RX ADMIN — SODIUM CHLORIDE 3 MILLILITER(S): 9 INJECTION INTRAMUSCULAR; INTRAVENOUS; SUBCUTANEOUS at 14:48

## 2023-09-05 RX ADMIN — SODIUM CHLORIDE 3 MILLILITER(S): 9 INJECTION INTRAMUSCULAR; INTRAVENOUS; SUBCUTANEOUS at 21:50

## 2023-09-05 RX ADMIN — Medication 30 MILLIGRAM(S): at 01:07

## 2023-09-05 RX ADMIN — Medication 600 MILLIGRAM(S): at 14:20

## 2023-09-05 RX ADMIN — Medication 30 MILLIGRAM(S): at 00:18

## 2023-09-05 RX ADMIN — Medication 600 MILLIGRAM(S): at 13:20

## 2023-09-05 RX ADMIN — Medication 1 TABLET(S): at 13:20

## 2023-09-05 RX ADMIN — Medication 975 MILLIGRAM(S): at 21:17

## 2023-09-05 RX ADMIN — Medication 600 MILLIGRAM(S): at 18:48

## 2023-09-05 RX ADMIN — Medication 975 MILLIGRAM(S): at 11:20

## 2023-09-05 NOTE — DISCHARGE NOTE OB - MEDICATION SUMMARY - MEDICATIONS TO TAKE
I will START or STAY ON the medications listed below when I get home from the hospital:  None I will START or STAY ON the medications listed below when I get home from the hospital:    ibuprofen 600 mg oral tablet  -- 1 tab(s) by mouth every 8 hours as needed for  moderate pain  -- Indication: For Pain     acetaminophen 325 mg oral tablet  -- 3 tab(s) by mouth every 8 hours as needed for  moderate pain  -- Indication: For Pain

## 2023-09-05 NOTE — DISCHARGE NOTE OB - CARE PLAN
1 Principal Discharge DX:	 (spontaneous vaginal delivery)  Assessment and plan of treatment:	Routine POSTPARTUM care

## 2023-09-05 NOTE — DISCHARGE NOTE OB - CARE PROVIDER_API CALL
Yomaira Meza  Obstetrics and Gynecology  [address] [place] [address], [address]  [place] [address]  Phone: [phone]  Fax: [phone]  Follow Up Time:

## 2023-09-05 NOTE — DISCHARGE NOTE OB - MATERIALS PROVIDED
PP materials provided PP materials provided/Vaccinations/[place] Screening Program/  Immunization Record/Breastfeeding Log/Guide to Postpartum Care/[facility] Hearing Screen Program/Birth Certificate Instructions/Tdap Vaccination (VIS Pub Date: 2012)

## 2023-09-05 NOTE — PROGRESS NOTE ADULT - SUBJECTIVE AND OBJECTIVE BOX
Post-partum Note,   She is a  42y woman who is now post-partum day: 1    Subjective:  The patient feels well.  She is ambulating.   She is tolerating regular diet.  She denies nausea and vomiting; denies fever.  She is voiding.  Her pain is controlled.  She reports normal postpartum bleeding.  She is formula feeding.    Physical exam:    Vital Signs Last 24 Hrs  T(C): 36.7 (05 Sep 2023 09:52), Max: 36.8 (04 Sep 2023 23:37)  T(F): 98 (05 Sep 2023 09:52), Max: 98.2 (04 Sep 2023 23:37)  HR: 70 (05 Sep 2023 09:52) (58 - 131)  BP: 111/73 (05 Sep 2023 09:52) (69/41 - 241/140)  BP(mean): --  RR: 18 (05 Sep 2023 09:52) (16 - 18)  SpO2: 100% (05 Sep 2023 09:52) (88% - 100%)    Parameters below as of 05 Sep 2023 09:52  Patient On (Oxygen Delivery Method): room air        Gen: NAD  Breast: Soft, nontender, not engorged.  Abdomen: Soft, nontender, no distension , firm uterine fundus at umbilicus.  Pelvic: Normal lochia noted  Ext: No calf tenderness    LABS:                        12.2   8.31  )-----------( 226      ( 04 Sep 2023 14:11 )             35.4     ABO Interpretation: O ( @ 14:30)  Rh Interpretation: Positive ( @ 14:30)  Antibody Screen: Negative ( @ 14:30)    Rubella status:     Allergies    No Known Allergies    Intolerances      MEDICATIONS  (STANDING):  acetaminophen     Tablet .. 975 milliGRAM(s) Oral <User Schedule>  diphtheria/tetanus/pertussis (acellular) Vaccine (Adacel) 0.5 milliLiter(s) IntraMuscular once  ibuprofen  Tablet. 600 milliGRAM(s) Oral every 6 hours  prenatal multivitamin 1 Tablet(s) Oral daily  sodium chloride 0.9% lock flush 3 milliLiter(s) IV Push every 8 hours    MEDICATIONS  (PRN):  benzocaine 20%/menthol 0.5% Spray 1 Spray(s) Topical every 6 hours PRN for Perineal discomfort  dibucaine 1% Ointment 1 Application(s) Topical every 6 hours PRN Perineal discomfort  diphenhydrAMINE 25 milliGRAM(s) Oral every 6 hours PRN Pruritus  hydrocortisone 1% Cream 1 Application(s) Topical every 6 hours PRN Moderate Pain (4-6)  lanolin Ointment 1 Application(s) Topical every 6 hours PRN nipple soreness  magnesium hydroxide Suspension 30 milliLiter(s) Oral two times a day PRN Constipation  oxyCODONE    IR 5 milliGRAM(s) Oral every 3 hours PRN Moderate to Severe Pain (4-10)  oxyCODONE    IR 5 milliGRAM(s) Oral once PRN Moderate to Severe Pain (4-10)  pramoxine 1%/zinc 5% Cream 1 Application(s) Topical every 4 hours PRN Moderate Pain (4-6)  simethicone 80 milliGRAM(s) Chew every 4 hours PRN Gas  witch hazel Pads 1 Application(s) Topical every 4 hours PRN Perineal discomfort        Assessment and Plan  PPD #1 s/p     Doing well.  Encourage ambulation.  PP & PPD Instructions reviewed.  Pp education materials provided  [facility].  Plan for D/C home tomorrow.  Plan reviewed with Dr. Khan.

## 2023-09-05 NOTE — DISCHARGE NOTE OB - AFTER URINATION AND/OR BOWEL MOVEMENT, CLEAN WITH WARM WATER USING A PERI- BOTTLE, THEN PAT DRY WITH TOILET TISSUE
[place] Pregnancy And Lactation Text: The risk during pregnancy and breastfeeding is uncertain with this medication. Statement Selected

## 2023-09-05 NOTE — OB NEONATOLOGY/PEDIATRICIAN DELIVERY SUMMARY - NSPEDSNEONOTESA_OBGYN_ALL_OB_FT
Peds called to LDR for cat II tracings for a 37.1 wk AGA male born via  to a 41 y/o  mother.  No significant maternal or prenatal history. Maternal labs include Blood Type O+, HIV - , RPR NR , Rubella I , Hep B - , GBS - /. SROM at 12:55 on  with clear fluids (ROM hours: 10HM).  Baby emerged vigorous, crying, was w/d/s/s with APGARS of 9/9 . Nuchal x1, 2 vessel cord. Mom plans to initiate formula feed, consents Hep B vaccine and consents circ.  Highest maternal temp: 36.6. EOS 0.12.    : 23  TOB: 2314  Weight: 2810

## 2023-09-05 NOTE — DISCHARGE NOTE OB - PATIENT PORTAL LINK FT
You can access the FollowMyHealth Patient Portal offered by [facility] by registering at the following website: [email or web address]. By joining [email or web address]’s FollowMyHealth portal, you will also be able to view your health information using other applications (apps) compatible with our system.

## 2023-09-06 VITALS
DIASTOLIC BLOOD PRESSURE: 74 MMHG | HEART RATE: 62 BPM | TEMPERATURE: 98 F | RESPIRATION RATE: 20 BRPM | OXYGEN SATURATION: 100 % | SYSTOLIC BLOOD PRESSURE: 112 MMHG

## 2023-09-06 RX ORDER — ACETAMINOPHEN 500 MG
3 TABLET ORAL
Qty: 0 | Refills: 0 | DISCHARGE
Start: 2023-09-06

## 2023-09-06 RX ORDER — IBUPROFEN 200 MG
1 TABLET ORAL
Qty: 0 | Refills: 0 | DISCHARGE
Start: 2023-09-06

## 2023-09-06 RX ADMIN — Medication 600 MILLIGRAM(S): at 05:17

## 2023-09-06 RX ADMIN — SODIUM CHLORIDE 3 MILLILITER(S): 9 INJECTION INTRAMUSCULAR; INTRAVENOUS; SUBCUTANEOUS at 06:10

## 2023-09-06 RX ADMIN — Medication 600 MILLIGRAM(S): at 00:19

## 2023-09-06 RX ADMIN — Medication 600 MILLIGRAM(S): at 12:04

## 2023-09-06 RX ADMIN — Medication 600 MILLIGRAM(S): at 12:30

## 2023-09-06 RX ADMIN — Medication 975 MILLIGRAM(S): at 09:00

## 2023-09-06 RX ADMIN — Medication 1 TABLET(S): at 12:04

## 2023-09-06 RX ADMIN — Medication 600 MILLIGRAM(S): at 01:10

## 2023-09-06 RX ADMIN — Medication 975 MILLIGRAM(S): at 08:29

## 2023-09-06 RX ADMIN — Medication 600 MILLIGRAM(S): at 06:10

## 2023-09-06 NOTE — PROGRESS NOTE ADULT - ASSESSMENT
Patient seen at bedside resting comfortably offers no current complaints.  Ambulating and voiding without difficulty.  Passing flatus and tolerating regular diet.  both breast/bottle feeding.  Denies HA, CP, SOB, N/V/D, dizziness, palpitations, worsening abdominal pain, worsening vaginal bleeding, or any other concerns.      Vital Signs Last 24 Hrs  T(C): 36.5 (06 Sep 2023 06:30), Max: 36.7 (05 Sep 2023 09:52)  T(F): 97.7 (06 Sep 2023 06:30), Max: 98 (05 Sep 2023 09:52)  HR: 57 (06 Sep 2023 06:30) (57 - 70)  BP: 118/74 (06 Sep 2023 06:30) (111/73 - 130/82)  BP(mean): --  RR: 18 (06 Sep 2023 06:30) (18 - 18)  SpO2: 100% (06 Sep 2023 06:30) (100% - 100%)    Parameters below as of 06 Sep 2023 06:30  Patient On (Oxygen Delivery Method): room air        Physical Exam:     Gen: A&Ox 3, NAD  Chest: CTA B/L  Cardiac: S1,S2; RRR  Breast: Soft, nontender, nonengorged  Abdomen: +BS; Soft, nontender, ND; Fundus firm below umbilicus  Gyn: Min lochia  Ext: Nontender, DTRS 2+, no worsening edema                          12.2   8.31  )-----------( 226      ( 04 Sep 2023 14:11 )             35.4        A/P: 42 PPD#2 s/p  2023     - Meeting PP milestones   - Discharge home with instructions  -Follow up in office in 5-6 weeks for postpartum visit    -d/w dr Gerard Umana NP

## 2024-05-01 NOTE — DISCHARGE NOTE OB - FOUL SMELLING VAGINAL DISCHARGE
From: Karmen Sheridan  To: Javier Ayala  Sent: 5/1/2024 9:15 AM CDT  Subject: Ortho follow up     Hi Dr. Ayala. I just wanted to update you about the numbness and pain I have been feeling with my wrists. I had followed up with Dr. Vazquez at [facility] orthopedics. After doing a nerve study, he determined that I needed surgery on my right wrist within the next 3 months. So you will be getting forms for medical clearance for surgery scheduled for May 24th. The clearance and paperwork will need to be completed and turned into their office at least a week prior to surgery. He did not want to try any steroid injections since newly diagnosed with Diabetes and didn’t want my sugars to increase.   
Statement Selected

## 2024-06-13 NOTE — OB RN TRIAGE NOTE - NS PRO ABUSE SCREEN SUSPICION NEGLECT YN
Outpatient Care Management  Plan of Care Follow Up Visit    Patient: Jessee Colon  MRN: [id number]  Date of Service: 06/13/2024  Completed by: Shannen Avendano RN  Referral Date: 03/04/2024    Reason for Visit   Patient presents with    OPCM RN Follow Up Call       Brief Summary: OPCM RN followed up with Mrs. Hooks today for care plan review.    Next Steps:   Mrs. Hooks agreed to OPCM RN follow up on or around 7/1/24.  Current weight.  Address lifestyle changes and dietary intake.  PATIENT SELF MANAGEMENT PLAN:  Mrs. Hooks agreed to continue on her weight loss journey with current daily medication regimen.    
no

## 2025-04-29 NOTE — OB RN PATIENT PROFILE - CENTRAL VENOUS CATHETER
Left message for patient to call ASAP to schedule.  Dr. Wen has an acute opening on Thursday.  Please schedule if still available.  Patient was notified it may not be there when she calls back.   no